# Patient Record
(demographics unavailable — no encounter records)

---

## 2020-01-30 NOTE — RAD
TWO VIEW CHEST:

1/30/20

 

HISTORY: 

Cough and wheezing.

 

Lungs are clear of infiltrate. Heart and mediastinum unremarkable. 

 

IMPRESSION: 

No acute findings. 

 

POS: [facility]